# Patient Record
Sex: FEMALE | Race: WHITE | Employment: FULL TIME | URBAN - METROPOLITAN AREA
[De-identification: names, ages, dates, MRNs, and addresses within clinical notes are randomized per-mention and may not be internally consistent; named-entity substitution may affect disease eponyms.]

---

## 2022-03-18 PROBLEM — K62.89 IDIOPATHIC PROCTITIS: Status: ACTIVE | Noted: 2017-03-07

## 2022-03-19 PROBLEM — E66.9 EXTREME OBESITY: Status: ACTIVE | Noted: 2017-03-07

## 2022-03-19 PROBLEM — E66.8 EXTREME OBESITY: Status: ACTIVE | Noted: 2017-03-07

## 2022-03-19 PROBLEM — F41.9 ANXIETY AND DEPRESSION: Status: ACTIVE | Noted: 2017-03-07

## 2022-03-19 PROBLEM — F32.A ANXIETY AND DEPRESSION: Status: ACTIVE | Noted: 2017-03-07

## 2022-03-19 PROBLEM — E28.39 HYPOESTROGENISM: Status: ACTIVE | Noted: 2017-03-07

## 2022-03-20 PROBLEM — R00.2 INTERMITTENT PALPITATIONS: Status: ACTIVE | Noted: 2017-03-07

## 2024-05-02 ENCOUNTER — OFFICE VISIT (OUTPATIENT)
Dept: SURGERY | Age: 44
End: 2024-05-02
Payer: COMMERCIAL

## 2024-05-02 VITALS — WEIGHT: 146.6 LBS | HEART RATE: 5 BPM | SYSTOLIC BLOOD PRESSURE: 133 MMHG | DIASTOLIC BLOOD PRESSURE: 82 MMHG

## 2024-05-02 DIAGNOSIS — R10.9 ABDOMINAL WALL PAIN: Primary | ICD-10-CM

## 2024-05-02 DIAGNOSIS — R18.8 ABDOMINAL WALL FLUID COLLECTIONS: ICD-10-CM

## 2024-05-02 PROCEDURE — 99203 OFFICE O/P NEW LOW 30 MIN: CPT | Performed by: SURGERY

## 2024-05-02 RX ORDER — LORAZEPAM 0.5 MG/1
0.5 TABLET ORAL EVERY 8 HOURS PRN
COMMUNITY
Start: 2023-09-15

## 2024-05-02 RX ORDER — LOSARTAN POTASSIUM 25 MG/1
25 TABLET ORAL EVERY MORNING
COMMUNITY

## 2024-05-02 RX ORDER — LISDEXAMFETAMINE DIMESYLATE 50 MG/1
50 CAPSULE ORAL EVERY MORNING
COMMUNITY
Start: 2023-08-09

## 2024-05-02 RX ORDER — DEXTROAMPHETAMINE SACCHARATE, AMPHETAMINE ASPARTATE, DEXTROAMPHETAMINE SULFATE AND AMPHETAMINE SULFATE 2.5; 2.5; 2.5; 2.5 MG/1; MG/1; MG/1; MG/1
10 TABLET ORAL DAILY PRN
COMMUNITY
Start: 2023-08-25

## 2024-05-02 RX ORDER — VITAMIN B COMPLEX
1000 TABLET ORAL
COMMUNITY

## 2024-05-02 RX ORDER — BUPROPION HYDROCHLORIDE 150 MG/1
150 TABLET, EXTENDED RELEASE ORAL DAILY
COMMUNITY

## 2024-05-02 RX ORDER — PANTOPRAZOLE SODIUM 40 MG/1
40 TABLET, DELAYED RELEASE ORAL DAILY
COMMUNITY
Start: 2024-01-05

## 2024-05-02 ASSESSMENT — ENCOUNTER SYMPTOMS
ALLERGIC/IMMUNOLOGIC NEGATIVE: 1
EYES NEGATIVE: 1
RESPIRATORY NEGATIVE: 1
GASTROINTESTINAL NEGATIVE: 1

## 2024-05-02 NOTE — PROGRESS NOTES
5/2/2024    Veronica Gentile  MRN: [id number]      CHIEF COMPLAINT: Painful masses on left chest and abdominal wall      PRIMARY CARE PHYSICIAN: Bhavin Delcid MD      HISTORY:  Several years of areas of tenderness and pain on the left chest and abdominal wall.  Seems to get worse at times.  Cannot feel a discrete mass.  She has a history of endometriosis and is status post total hysterectomy.  She has had history of laparoscopic appendectomy.  She has had imaging done at outside hospitals, reports are available but I am not able to view the images.  Ultrasound has some suggestion of a fluid collection.  She denies fevers or chills.  She has had no changes in her bowel or bladder habits.  CT  IMPRESSION:   Full evaluation of solid viscera is limited secondary to lack of intravenous contrast.   1. No acute abnormality is seen within the abdomen or pelvis by CT criteria.   FINDINGS:   Sonographic images of the intra-abdominal wall were obtained.   US  There is a 0.8 x 0.4 x 0.3 cm hypoechoic focus and 3.5 x 2.0 x 0.4 cm hypoechoic focus below the LEFT rib cage. These appear to represent small fluid collections. Both of these have smooth margins with no vascularity.     IMPRESSION   IMPRESSION:   2 small hypoechoic foci below the level of the LEFT rib cage appear to represent fluid collections       REVIEW OF SYSTEMS:  Review of Systems   Constitutional: Negative.    HENT: Negative.     Eyes: Negative.    Respiratory: Negative.     Cardiovascular: Negative.    Gastrointestinal: Negative.    Endocrine: Negative.    Genitourinary: Negative.    Musculoskeletal: Negative.    Skin: Negative.    Allergic/Immunologic: Negative.    Neurological: Negative.    Hematological: Negative.    Psychiatric/Behavioral: Negative.            History reviewed. No pertinent past medical history.    Current Outpatient Medications   Medication Sig Dispense Refill    Vitamin D (CHOLECALCIFEROL) 25 MCG (1000 UT) TABS tablet

## 2024-05-23 ENCOUNTER — OFFICE VISIT (OUTPATIENT)
Dept: SURGERY | Age: 44
End: 2024-05-23
Payer: COMMERCIAL

## 2024-05-23 ENCOUNTER — PREP FOR PROCEDURE (OUTPATIENT)
Dept: SURGERY | Age: 44
End: 2024-05-23

## 2024-05-23 VITALS — SYSTOLIC BLOOD PRESSURE: 154 MMHG | WEIGHT: 147.5 LBS | HEART RATE: 81 BPM | DIASTOLIC BLOOD PRESSURE: 98 MMHG

## 2024-05-23 DIAGNOSIS — R18.8 ABDOMINAL WALL FLUID COLLECTIONS: ICD-10-CM

## 2024-05-23 DIAGNOSIS — R22.2 ABDOMINAL WALL MASS: ICD-10-CM

## 2024-05-23 DIAGNOSIS — R10.9 ABDOMINAL WALL PAIN: Primary | ICD-10-CM

## 2024-05-23 PROCEDURE — 99214 OFFICE O/P EST MOD 30 MIN: CPT | Performed by: SURGERY

## 2024-05-23 ASSESSMENT — ENCOUNTER SYMPTOMS
ALLERGIC/IMMUNOLOGIC NEGATIVE: 1
RESPIRATORY NEGATIVE: 1
GASTROINTESTINAL NEGATIVE: 1
EYES NEGATIVE: 1

## 2024-05-23 NOTE — PROGRESS NOTES
5/23/2024    Veronica Gentile  MRN: [id number]      CHIEF COMPLAINT: Review imaging      PRIMARY CARE PHYSICIAN: Gina Madison, APRN - NP      HISTORY:  Several years of areas of tenderness and pain on the left chest and abdominal wall.  Seems to get worse at times.  Cannot feel a discrete mass.  She has a history of endometriosis and is status post total hysterectomy.  She has had history of laparoscopic appendectomy.  She has had imaging done at outside hospitals, reports are available but I am not able to view the images.  Ultrasound has some suggestion of a fluid collection.  She denies fevers or chills.  She has had no changes in her bowel or bladder habits.  CT  IMPRESSION:   Full evaluation of solid viscera is limited secondary to lack of intravenous contrast.   1. No acute abnormality is seen within the abdomen or pelvis by CT criteria.   FINDINGS:   Sonographic images of the intra-abdominal wall were obtained.   US  There is a 0.8 x 0.4 x 0.3 cm hypoechoic focus and 3.5 x 2.0 x 0.4 cm hypoechoic focus below the LEFT rib cage. These appear to represent small fluid collections. Both of these have smooth margins with no vascularity.    She is here for follow-up after recent CT and ultrasound which both demonstrated no abnormalities.  She tells me she can still feel a lump in the left subcostal location.    REVIEW OF SYSTEMS:  Review of Systems   Constitutional: Negative.    HENT: Negative.     Eyes: Negative.    Respiratory: Negative.     Cardiovascular: Negative.    Gastrointestinal: Negative.    Endocrine: Negative.    Genitourinary: Negative.    Musculoskeletal: Negative.    Skin: Negative.    Allergic/Immunologic: Negative.    Neurological: Negative.    Hematological: Negative.    Psychiatric/Behavioral: Negative.            History reviewed. No pertinent past medical history.    Current Outpatient Medications   Medication Sig Dispense Refill    Vitamin D (CHOLECALCIFEROL) 25 MCG (1000 UT)

## 2024-06-14 ENCOUNTER — TELEPHONE (OUTPATIENT)
Dept: SURGERY | Age: 44
End: 2024-06-14

## 2024-06-19 NOTE — PERIOP NOTE
Patient verified name and .  Order for consent not found in EHR.   Type 1B surgery, PAT phone assessment complete.   Labs per surgeon:  Orders not received.  Labs per anesthesia protocol: none.    Patient answered medical/surgical history questions at their best of ability. All prior to admission medications documented in EPIC.    Patient instructed to continue taking all prescription medications up to the day of surgery but to take only the following medications the day of surgery according to anesthesia guidelines with a small sip of water: ativan if needed, metoprolol ,protonix. Also, patient is requested to take 2 Tylenol in the morning and then again before bed on the day before surgery. Regular or extra strength may be used.       Patient informed that all vitamins and supplements should be held 7 days prior to surgery and NSAIDS 5 days prior to surgery.     Patient instructed on the following:    > Arrive at A Entrance, time of arrival to be called the day before by 1700  > NPO after midnight, unless otherwise indicated, including gum, mints, and ice chips  > Responsible adult must drive patient to the hospital, stay during surgery, and patient will need supervision 24 hours after anesthesia  > Use non moisturizing soap in shower the night before surgery and on the morning of surgery  > All piercings must be removed prior to arrival.    > Leave all valuables (money and jewelry) at home but bring insurance card and ID on DOS.   > Do not wear make-up, nail polish, lotions, cologne, perfumes, powders, or oil on skin. Artificial nails are not permitted.

## 2024-06-23 ENCOUNTER — ANESTHESIA EVENT (OUTPATIENT)
Dept: SURGERY | Age: 44
End: 2024-06-23
Payer: COMMERCIAL

## 2024-06-24 ENCOUNTER — ANESTHESIA (OUTPATIENT)
Dept: SURGERY | Age: 44
End: 2024-06-24
Payer: COMMERCIAL

## 2024-06-24 ENCOUNTER — HOSPITAL ENCOUNTER (OUTPATIENT)
Age: 44
Setting detail: OUTPATIENT SURGERY
Discharge: HOME | End: 2024-06-24
Attending: SURGERY | Admitting: SURGERY
Payer: COMMERCIAL

## 2024-06-24 VITALS
OXYGEN SATURATION: 100 % | RESPIRATION RATE: 16 BRPM | WEIGHT: 146 LBS | SYSTOLIC BLOOD PRESSURE: 131 MMHG | DIASTOLIC BLOOD PRESSURE: 92 MMHG | BODY MASS INDEX: 25.87 KG/M2 | HEART RATE: 61 BPM | TEMPERATURE: 97.9 F | HEIGHT: 63 IN

## 2024-06-24 PROCEDURE — 2709999900 HC NON-CHARGEABLE SUPPLY: Performed by: SURGERY

## 2024-06-24 PROCEDURE — 6360000002 HC RX W HCPCS: Performed by: ANESTHESIOLOGY

## 2024-06-24 PROCEDURE — 3700000001 HC ADD 15 MINUTES (ANESTHESIA): Performed by: SURGERY

## 2024-06-24 PROCEDURE — 22902 EXC ABD LES SC < 3 CM: CPT | Performed by: SURGERY

## 2024-06-24 PROCEDURE — 2580000003 HC RX 258: Performed by: ANESTHESIOLOGY

## 2024-06-24 PROCEDURE — 88305 TISSUE EXAM BY PATHOLOGIST: CPT

## 2024-06-24 PROCEDURE — 7100000000 HC PACU RECOVERY - FIRST 15 MIN: Performed by: SURGERY

## 2024-06-24 PROCEDURE — 3700000000 HC ANESTHESIA ATTENDED CARE: Performed by: SURGERY

## 2024-06-24 PROCEDURE — 6360000002 HC RX W HCPCS: Performed by: SURGERY

## 2024-06-24 PROCEDURE — 2500000003 HC RX 250 WO HCPCS: Performed by: NURSE ANESTHETIST, CERTIFIED REGISTERED

## 2024-06-24 PROCEDURE — 6360000002 HC RX W HCPCS: Performed by: NURSE ANESTHETIST, CERTIFIED REGISTERED

## 2024-06-24 PROCEDURE — 6370000000 HC RX 637 (ALT 250 FOR IP): Performed by: ANESTHESIOLOGY

## 2024-06-24 PROCEDURE — 7100000011 HC PHASE II RECOVERY - ADDTL 15 MIN: Performed by: SURGERY

## 2024-06-24 PROCEDURE — 7100000001 HC PACU RECOVERY - ADDTL 15 MIN: Performed by: SURGERY

## 2024-06-24 PROCEDURE — 3600000012 HC SURGERY LEVEL 2 ADDTL 15MIN: Performed by: SURGERY

## 2024-06-24 PROCEDURE — 3600000002 HC SURGERY LEVEL 2 BASE: Performed by: SURGERY

## 2024-06-24 PROCEDURE — 88304 TISSUE EXAM BY PATHOLOGIST: CPT

## 2024-06-24 PROCEDURE — 7100000010 HC PHASE II RECOVERY - FIRST 15 MIN: Performed by: SURGERY

## 2024-06-24 RX ORDER — FENTANYL CITRATE 50 UG/ML
100 INJECTION, SOLUTION INTRAMUSCULAR; INTRAVENOUS
Status: DISCONTINUED | OUTPATIENT
Start: 2024-06-24 | End: 2024-06-24 | Stop reason: HOSPADM

## 2024-06-24 RX ORDER — SODIUM CHLORIDE 0.9 % (FLUSH) 0.9 %
5-40 SYRINGE (ML) INJECTION PRN
Status: DISCONTINUED | OUTPATIENT
Start: 2024-06-24 | End: 2024-06-24 | Stop reason: HOSPADM

## 2024-06-24 RX ORDER — OXYCODONE HYDROCHLORIDE 5 MG/1
5 TABLET ORAL
Status: DISCONTINUED | OUTPATIENT
Start: 2024-06-24 | End: 2024-06-24 | Stop reason: HOSPADM

## 2024-06-24 RX ORDER — MIDAZOLAM HYDROCHLORIDE 1 MG/ML
INJECTION INTRAMUSCULAR; INTRAVENOUS PRN
Status: DISCONTINUED | OUTPATIENT
Start: 2024-06-24 | End: 2024-06-24 | Stop reason: SDUPTHER

## 2024-06-24 RX ORDER — LIDOCAINE HYDROCHLORIDE 20 MG/ML
INJECTION, SOLUTION EPIDURAL; INFILTRATION; INTRACAUDAL; PERINEURAL PRN
Status: DISCONTINUED | OUTPATIENT
Start: 2024-06-24 | End: 2024-06-24 | Stop reason: SDUPTHER

## 2024-06-24 RX ORDER — SODIUM CHLORIDE 9 MG/ML
INJECTION, SOLUTION INTRAVENOUS PRN
Status: DISCONTINUED | OUTPATIENT
Start: 2024-06-24 | End: 2024-06-24 | Stop reason: HOSPADM

## 2024-06-24 RX ORDER — FENTANYL CITRATE 50 UG/ML
INJECTION, SOLUTION INTRAMUSCULAR; INTRAVENOUS PRN
Status: DISCONTINUED | OUTPATIENT
Start: 2024-06-24 | End: 2024-06-24 | Stop reason: SDUPTHER

## 2024-06-24 RX ORDER — SCOLOPAMINE TRANSDERMAL SYSTEM 1 MG/1
1 PATCH, EXTENDED RELEASE TRANSDERMAL ONCE
Status: DISCONTINUED | OUTPATIENT
Start: 2024-06-24 | End: 2024-06-24 | Stop reason: HOSPADM

## 2024-06-24 RX ORDER — SODIUM CHLORIDE, SODIUM LACTATE, POTASSIUM CHLORIDE, CALCIUM CHLORIDE 600; 310; 30; 20 MG/100ML; MG/100ML; MG/100ML; MG/100ML
INJECTION, SOLUTION INTRAVENOUS CONTINUOUS
Status: DISCONTINUED | OUTPATIENT
Start: 2024-06-24 | End: 2024-06-24 | Stop reason: HOSPADM

## 2024-06-24 RX ORDER — GINSENG 100 MG
CAPSULE ORAL PRN
Status: DISCONTINUED | OUTPATIENT
Start: 2024-06-24 | End: 2024-06-24 | Stop reason: ALTCHOICE

## 2024-06-24 RX ORDER — ACETAMINOPHEN 500 MG
1000 TABLET ORAL ONCE
Status: COMPLETED | OUTPATIENT
Start: 2024-06-24 | End: 2024-06-24

## 2024-06-24 RX ORDER — SODIUM CHLORIDE 0.9 % (FLUSH) 0.9 %
5-40 SYRINGE (ML) INJECTION EVERY 12 HOURS SCHEDULED
Status: DISCONTINUED | OUTPATIENT
Start: 2024-06-24 | End: 2024-06-24 | Stop reason: HOSPADM

## 2024-06-24 RX ORDER — MIDAZOLAM HYDROCHLORIDE 2 MG/2ML
2 INJECTION, SOLUTION INTRAMUSCULAR; INTRAVENOUS
Status: DISCONTINUED | OUTPATIENT
Start: 2024-06-24 | End: 2024-06-24 | Stop reason: HOSPADM

## 2024-06-24 RX ORDER — NALOXONE HYDROCHLORIDE 0.4 MG/ML
INJECTION, SOLUTION INTRAMUSCULAR; INTRAVENOUS; SUBCUTANEOUS PRN
Status: DISCONTINUED | OUTPATIENT
Start: 2024-06-24 | End: 2024-06-24 | Stop reason: HOSPADM

## 2024-06-24 RX ORDER — PROPOFOL 10 MG/ML
INJECTION, EMULSION INTRAVENOUS PRN
Status: DISCONTINUED | OUTPATIENT
Start: 2024-06-24 | End: 2024-06-24 | Stop reason: SDUPTHER

## 2024-06-24 RX ORDER — EPHEDRINE SULFATE/0.9% NACL/PF 50 MG/5 ML
SYRINGE (ML) INTRAVENOUS PRN
Status: DISCONTINUED | OUTPATIENT
Start: 2024-06-24 | End: 2024-06-24 | Stop reason: SDUPTHER

## 2024-06-24 RX ORDER — BUPIVACAINE HYDROCHLORIDE 5 MG/ML
INJECTION, SOLUTION EPIDURAL; INTRACAUDAL PRN
Status: DISCONTINUED | OUTPATIENT
Start: 2024-06-24 | End: 2024-06-24 | Stop reason: ALTCHOICE

## 2024-06-24 RX ORDER — DIPHENHYDRAMINE HYDROCHLORIDE 50 MG/ML
12.5 INJECTION INTRAMUSCULAR; INTRAVENOUS
Status: DISCONTINUED | OUTPATIENT
Start: 2024-06-24 | End: 2024-06-24 | Stop reason: HOSPADM

## 2024-06-24 RX ORDER — ONDANSETRON 2 MG/ML
4 INJECTION INTRAMUSCULAR; INTRAVENOUS
Status: DISCONTINUED | OUTPATIENT
Start: 2024-06-24 | End: 2024-06-24 | Stop reason: HOSPADM

## 2024-06-24 RX ORDER — ONDANSETRON 2 MG/ML
INJECTION INTRAMUSCULAR; INTRAVENOUS PRN
Status: DISCONTINUED | OUTPATIENT
Start: 2024-06-24 | End: 2024-06-24 | Stop reason: SDUPTHER

## 2024-06-24 RX ADMIN — MIDAZOLAM 2 MG: 1 INJECTION INTRAMUSCULAR; INTRAVENOUS at 07:00

## 2024-06-24 RX ADMIN — FENTANYL CITRATE 100 MCG: 50 INJECTION, SOLUTION INTRAMUSCULAR; INTRAVENOUS at 07:07

## 2024-06-24 RX ADMIN — SODIUM CHLORIDE, POTASSIUM CHLORIDE, SODIUM LACTATE AND CALCIUM CHLORIDE: 600; 310; 30; 20 INJECTION, SOLUTION INTRAVENOUS at 06:30

## 2024-06-24 RX ADMIN — ONDANSETRON 4 MG: 2 INJECTION INTRAMUSCULAR; INTRAVENOUS at 07:20

## 2024-06-24 RX ADMIN — LIDOCAINE HYDROCHLORIDE 60 MG: 20 INJECTION, SOLUTION EPIDURAL; INFILTRATION; INTRACAUDAL; PERINEURAL at 07:07

## 2024-06-24 RX ADMIN — ACETAMINOPHEN 1000 MG: 500 TABLET, FILM COATED ORAL at 06:20

## 2024-06-24 RX ADMIN — Medication 2000 MG: at 07:02

## 2024-06-24 RX ADMIN — HYDROMORPHONE HYDROCHLORIDE 0.5 MG: 1 INJECTION, SOLUTION INTRAMUSCULAR; INTRAVENOUS; SUBCUTANEOUS at 07:56

## 2024-06-24 RX ADMIN — Medication 10 MG: at 07:35

## 2024-06-24 RX ADMIN — PROPOFOL 200 MG: 10 INJECTION, EMULSION INTRAVENOUS at 07:07

## 2024-06-24 ASSESSMENT — PAIN DESCRIPTION - LOCATION
LOCATION: ABDOMEN

## 2024-06-24 ASSESSMENT — PAIN DESCRIPTION - ORIENTATION
ORIENTATION: MID

## 2024-06-24 ASSESSMENT — PAIN SCALES - GENERAL
PAINLEVEL_OUTOF10: 3
PAINLEVEL_OUTOF10: 5
PAINLEVEL_OUTOF10: 3
PAINLEVEL_OUTOF10: 7
PAINLEVEL_OUTOF10: 3
PAINLEVEL_OUTOF10: 3

## 2024-06-24 ASSESSMENT — PAIN DESCRIPTION - DESCRIPTORS
DESCRIPTORS: ACHING;BURNING
DESCRIPTORS: ACHING
DESCRIPTORS: ACHING;BURNING
DESCRIPTORS: ACHING;BURNING
DESCRIPTORS: ACHING;CRAMPING
DESCRIPTORS: ACHING;BURNING
DESCRIPTORS: ACHING;BURNING

## 2024-06-24 ASSESSMENT — PAIN - FUNCTIONAL ASSESSMENT: PAIN_FUNCTIONAL_ASSESSMENT: 0-10

## 2024-06-24 NOTE — ANESTHESIA POSTPROCEDURE EVALUATION
Department of Anesthesiology  Postprocedure Note    Patient: Veronica Gentile  MRN: [id number]  YOB: 1980  Date of evaluation: 6/24/2024    Procedure Summary       Date: 06/24/24 [place] [facility] [place] [facility] [place]    Anesthesia Start: 0702 Anesthesia Stop: 0751    Procedure: ABDOMEN MASS EXCISION Diagnosis:       Abdominal wall mass      (Abdominal wall mass [R22.2])    Surgeons: Yordy Ellis Jr., MD Responsible Provider: Kyle Webb MD    Anesthesia Type: general ASA Status: 2            Anesthesia Type: No value filed.    Israel Phase I: Israel Score: 8    Israel Phase II:      Anesthesia Post Evaluation    Patient location during evaluation: PACU  Patient participation: complete - patient participated  Level of consciousness: awake and alert  Airway patency: patent  Nausea & Vomiting: no nausea and no vomiting  Cardiovascular status: hemodynamically stable  Respiratory status: acceptable, nonlabored ventilation and spontaneous ventilation  Hydration status: euvolemic  Comments: /64   Pulse 53   Temp 98.4 °F (36.9 °C) (Infrared)   Resp 14   Ht 1.6 m (5' 3\")   Wt 66.2 kg (146 lb)   SpO2 98%   BMI 25.86 kg/m²     Multimodal analgesia pain management approach  Pain management: adequate and satisfactory to patient    No notable events documented.

## 2024-06-24 NOTE — H&P
Magnesium Oxide, Laxative, 500 MG TABS Take by mouth        Multiple Vitamin (MULTI VITAMIN PO) Take by mouth        amphetamine-dextroamphetamine (ADDERALL) 10 MG tablet Take 1 tablet by mouth daily as needed.        ascorbic acid (VITAMIN C) 1000 MG tablet Take 1 tablet by mouth daily        lisdexamfetamine (VYVANSE) 50 MG capsule Take 1 capsule by mouth every morning.        LORazepam (ATIVAN) 0.5 MG tablet Take 1 tablet by mouth every 8 hours as needed.        losartan (COZAAR) 25 MG tablet Take 1 tablet by mouth every morning        metoprolol tartrate (LOPRESSOR) 25 MG tablet Take 1 tablet by mouth 2 times daily        pantoprazole (PROTONIX) 40 MG tablet Take 1 tablet by mouth daily        BIOTIN PO Take by mouth          No current facility-administered medications for this visit.            Family History   History reviewed. No pertinent family history.        Social History   Social History            Socioeconomic History    Marital status: Single       Spouse name: None    Number of children: None    Years of education: None    Highest education level: None   Tobacco Use    Smoking status: Former       Types: Cigarettes    Smokeless tobacco: Never               PHYSICAL EXAMINATION:  Physical Exam  Constitutional:       Appearance: Normal appearance.   Cardiovascular:      Rate and Rhythm: Normal rate and regular rhythm.   Pulmonary:      Effort: Pulmonary effort is normal.   Abdominal:      Palpations: Abdomen is soft.   Musculoskeletal:         General: Normal range of motion.      Cervical back: Normal range of motion and neck supple.   Skin:     General: Skin is warm and dry.      Comments: Small palpable nodule measuring approximately 3mm just below the left rib cage on the abdominal wall.  She is very tender at the site.   Neurological:      General: No focal deficit present.      Mental Status: She is alert and oriented to person, place, and time.      Sensory: Sensation is intact.

## 2024-06-24 NOTE — BRIEF OP NOTE
Brief Postoperative Note      Patient: Veronica Gentile  YOB: 1980  MRN: [id number]    Date of Procedure: 6/24/2024    Pre-Op Diagnosis Codes:     * Abdominal wall mass [R22.2]    Post-Op Diagnosis: Same       Procedure(s):  ABDOMEN MASS EXCISION    Surgeon(s):  Yordy Ellis Jr., MD    Assistant:  First Assistant: January Raman    Anesthesia: General    Estimated Blood Loss (mL): 5 mL    Complications: None    Specimens:   ID Type Source Tests Collected by Time Destination   A : Abdominal Wall Mass Tissue Abdomen SURGICAL PATHOLOGY Yordy Ellis Jr., MD 6/24/2024 0724        Implants:  * No implants in log *      Drains: * No LDAs found *    Findings:  Infection Present At Time Of Surgery (PATOS) (choose all levels that have infection present):  No infection present  Other Findings: 8 mm fatty appearing mass      Electronically signed by Yordy Ellis Jr, MD on 6/24/2024 at 8:55 AM

## 2024-06-24 NOTE — ANESTHESIA PRE PROCEDURE
Cigarettes     Start date: 10/2019     Quit date: 10/1996     Years since quittin.7   • Smokeless tobacco: Never   Substance Use Topics   • Alcohol use: Yes     Comment: occas                                Counseling given: Not Answered      Vital Signs (Current):   Vitals:    24 1157 24 0556   BP:  (!) 162/96   Pulse:  67   Resp:  16   Temp:  98.1 °F (36.7 °C)   TempSrc:  Temporal   SpO2:  100%   Weight: 65.8 kg (145 lb) 66.2 kg (146 lb)   Height: 1.6 m (5' 3\") 1.6 m (5' 3\")                                              BP Readings from Last 3 Encounters:   24 (!) 162/96   24 (!) 154/98   24 133/82       NPO Status: Time of last liquid consumption:                         Time of last solid consumption:                         Date of last liquid consumption: 24                        Date of last solid food consumption: 24    BMI:   Wt Readings from Last 3 Encounters:   24 66.2 kg (146 lb)   24 66.9 kg (147 lb 8 oz)   24 66.5 kg (146 lb 9.6 oz)     Body mass index is 25.86 kg/m².    CBC: No results found for: \"WBC\", \"RBC\", \"HGB\", \"HCT\", \"MCV\", \"RDW\", \"PLT\"    CMP: No results found for: \"NA\", \"K\", \"CL\", \"CO2\", \"BUN\", \"CREATININE\", \"GFRAA\", \"AGRATIO\", \"LABGLOM\", \"GLUCOSE\", \"GLU\", \"PROT\", \"CALCIUM\", \"BILITOT\", \"ALKPHOS\", \"AST\", \"ALT\"    POC Tests: No results for input(s): \"POCGLU\", \"POCNA\", \"POCK\", \"POCCL\", \"POCBUN\", \"POCHEMO\", \"POCHCT\" in the last 72 hours.    Coags: No results found for: \"PROTIME\", \"INR\", \"APTT\"    HCG (If Applicable): No results found for: \"PREGTESTUR\", \"PREGSERUM\", \"HCG\", \"HCGQUANT\"     ABGs: No results found for: \"PHART\", \"PO2ART\", \"OPX8WKT\", \"JMV6ASO\", \"BEART\", \"U2NVSXWR\"     Type & Screen (If Applicable):  No results found for: \"LABABO\"    Drug/Infectious Status (If Applicable):  No results found for: \"HIV\", \"HEPCAB\"    COVID-19 Screening (If Applicable): No results found for: \"COVID19\"        Anesthesia

## 2024-06-24 NOTE — DISCHARGE INSTRUCTIONS
Activity: Please take it easy for a couple of days after surgery.  Walking is encouraged. The sooner you are up walking the faster your recovery. You should avoid lifting, pushing, and/or pulling anything greater than 10 pounds for 2 weeks following surgery (a gallon of milk weighs approximately 8.6 lbs). You may resume work and full activity within 2-4 weeks. This will help your incision stay intact.     Diet: You may resume a regular diet. The prescription for pain medication sometimes may cause nausea. If this happens, eat bland foods such as toast or crackers and sip ginger ale. If nausea is severe, please call our office.    You may have on a Scopolamine patch placed behind your ear for nausea prevention. The patch is effective for 72 hours after placement. This medication may cause dizziness or blurred vision. The patch may be removed prior to 72 hours if nausea is not present. You may peel it off, being sure to wash hands thoroughly after removal.    Pain: You may experience some pain in the surgical area. Use Ibuprofen (Advil) and/or Acetaminophen (Tylenol) if needed.     Care of your incisions: You will have dissolvable stitches and/or Dermabond, which is a type of skin glue. The sutures may stick up from the skin but they will fall off. You may shower 24 hours following surgery, but do not submerge your incisions in a bath, hot tub, or pool for 2 weeks. You may notice the incision feels like a hard ridge, this is to be expected. You may use a cold (ice) compress on the area of the incision for the first 24 hours to reduce swelling. Do not leave the compress in place for longer than 20-30 minutes at a time to protect the skin. Please no heat or heating pad. Using heat can easily cause a hematoma.     Bowel Elimination: Constipation may occur after surgery due to both the anesthesia and the narcotic pain medication.  Please try prune juice, Milk of Magnesia, a mild laxative such as Dulcolax, or a saline

## 2024-06-24 NOTE — OP NOTE
with the original specimen.  Hemostasis was achieved at the site.  Additional local anesthetic was infiltrated into the deeper tissues.  The deep tissues were closed with 3-0 Vicryl sutures after ensuring hemostasis.  The dermis was closed with 3-0 Vicryl and the skin with a 4-0 Monocryl subcuticular stitch followed by Dermabond.  She tolerated the procedure well without complications.  Lap and instrument count at the completion of the procedure was correct x 2.  Estimated blood loss was 5 mL or less.  Patient condition at the completion was stable and she was awoken from anesthesia then transported to recovery in stable condition.    Electronically signed by Yrody Ellis Jr, MD on 6/24/2024 at 8:55 AM

## 2024-07-02 ENCOUNTER — OFFICE VISIT (OUTPATIENT)
Dept: SURGERY | Age: 44
End: 2024-07-02

## 2024-07-02 DIAGNOSIS — Z09 POSTOP CHECK: Primary | ICD-10-CM

## 2024-07-02 PROCEDURE — 99024 POSTOP FOLLOW-UP VISIT: CPT | Performed by: SURGERY

## 2024-07-02 ASSESSMENT — ENCOUNTER SYMPTOMS
GASTROINTESTINAL NEGATIVE: 1
ALLERGIC/IMMUNOLOGIC NEGATIVE: 1
EYES NEGATIVE: 1
RESPIRATORY NEGATIVE: 1

## 2024-10-28 NOTE — PROGRESS NOTES
[facility] History & Physical                 Reason for Visit: Palpitations    Subjective:     Patient is a 44 y.o. female with a PMH of ill-defined condition (SVT), hyperlipidemia and hypertension who presents as a referral for palpitations.  The patient reports that she feels a \"terrible fluttering feeling\" in the chest on a daily basis.  She says that her chest will \"feel really heavy\" with SOB associated with the symptoms.  She reports that she has been taking losartan 100 mg daily for the last month and is on Lopressor 25 mg twice daily.  The patient reports a history of bradycardia with higher doses of Lopressor.  She reports that she was on a thiazide diuretic in the past but renal dysfunction ensued.  She also reports a history of cough with lisinopril.    Past Medical History:   Diagnosis Date    ADHD     Anxiety and depression     As needed meds    Gastroesophageal reflux disease with stricture     managed with meds    Hypertension     managed with meds    Palpitations     Tachycardia     managed with metoprolol      Past Surgical History:   Procedure Laterality Date    ABDOMEN SURGERY N/A 2024    ABDOMEN MASS EXCISION performed by Yordy Ellis Jr., [facility] at [facility]    APPENDECTOMY       SECTION      HYSTERECTOMY (CERVIX STATUS UNKNOWN)        No family history on file.   Social History     Tobacco Use    Smoking status: Former     Types: Cigarettes     Start date: 10/2019     Quit date: 10/1996     Years since quittin.0    Smokeless tobacco: Never   Substance Use Topics    Alcohol use: Yes     Comment: occas      Allergies   Allergen Reactions    Iodinated Contrast Media Palpitations and Shortness Of Breath     Patient reports \"heart felt like it was going to explode and couldn't breath\"         ROS:  No obvious pertinent positives on review of systems except for what was outlined above.       Objective:       BP (!) 160/110   Pulse 71   Ht 1.6 m (5' 3\")   Wt 72.6 kg

## 2024-10-30 ENCOUNTER — OFFICE VISIT (OUTPATIENT)
Age: 44
End: 2024-10-30
Payer: COMMERCIAL

## 2024-10-30 VITALS
DIASTOLIC BLOOD PRESSURE: 110 MMHG | HEIGHT: 63 IN | SYSTOLIC BLOOD PRESSURE: 160 MMHG | HEART RATE: 71 BPM | BODY MASS INDEX: 28.35 KG/M2 | WEIGHT: 160 LBS

## 2024-10-30 DIAGNOSIS — R00.2 PALPITATIONS: ICD-10-CM

## 2024-10-30 DIAGNOSIS — R06.00 DYSPNEA, UNSPECIFIED TYPE: ICD-10-CM

## 2024-10-30 DIAGNOSIS — R69 ILL-DEFINED CONDITION: ICD-10-CM

## 2024-10-30 DIAGNOSIS — I10 HYPERTENSION, UNSPECIFIED TYPE: Primary | ICD-10-CM

## 2024-10-30 DIAGNOSIS — R07.89 ATYPICAL CHEST PAIN: ICD-10-CM

## 2024-10-30 PROCEDURE — 93000 ELECTROCARDIOGRAM COMPLETE: CPT | Performed by: INTERNAL MEDICINE

## 2024-10-30 PROCEDURE — 3077F SYST BP >= 140 MM HG: CPT | Performed by: INTERNAL MEDICINE

## 2024-10-30 PROCEDURE — 99204 OFFICE O/P NEW MOD 45 MIN: CPT | Performed by: INTERNAL MEDICINE

## 2024-10-30 PROCEDURE — 3080F DIAST BP >= 90 MM HG: CPT | Performed by: INTERNAL MEDICINE

## 2024-10-30 RX ORDER — AMLODIPINE BESYLATE 5 MG/1
5 TABLET ORAL DAILY
Qty: 90 TABLET | Refills: 3 | Status: SHIPPED | OUTPATIENT
Start: 2024-10-30

## 2024-10-30 RX ORDER — LOSARTAN POTASSIUM 100 MG/1
100 TABLET ORAL DAILY
COMMUNITY

## 2024-11-12 ENCOUNTER — TELEPHONE (OUTPATIENT)
Age: 44
End: 2024-11-12

## 2024-11-12 NOTE — TELEPHONE ENCOUNTER
----- Message from Dr. Lion Fontenot MD sent at 11/12/2024 10:32 AM EST -----  Please let the patient know that she was predominantly in sinus rhythm with an average heart rate of 79 bpm.  She had nonsustained VT versus SVT with aberrancy x 1 as well as nonsustained SVT.  Rare ectopy was noted.  No patient events were noted.  No new changes to medical therapy at this time.  
Advised patient of monitor results and Dr. Fontenot's response. Patient verbalized understanding.   
Connie with [employer] called to report that Zio monitor showed 1 run of V-tach for 12 beats, maximum HR-174, average HR-151. Monitor also showed 3 runs of SVT. Monitor has been downloaded into medical record. Per medical record, patient is scheduled for echo on 11/27/24 and appointment with Dr. Fontenot on 12/2/24.   
numerical 0-10

## 2024-11-29 NOTE — PROGRESS NOTES
[facility] CARDIOLOGY Follow Up                 Reason for Visit: Follow-up testing    Subjective:     Patient is a 44 y.o. female with a PMH of paroxysmal SVT, hyperlipidemia and hypertension who presents for follow-up.  The patient was last seen in 2024.  Amlodipine was started for hypertension.  A sleep medicine referral was placed.  A ZIO for 3 days was ordered for palpitations.  A TTE was ordered for dyspnea.  She was noted to be predominantly in sinus rhythm with an average heart rate of 79 bpm.  Nonsustained VT versus SVT with aberrancy x 1 was noted.  Nonsustained SVT and rare ectopy was noted.  No patient events were noted.  She had a TTE in 2024 that was noted to demonstrate a normal EF.    Past Medical History:   Diagnosis Date    ADHD     Anxiety and depression     As needed meds    Gastroesophageal reflux disease with stricture     managed with meds    Hypertension     managed with meds    Palpitations     Tachycardia     managed with metoprolol      Past Surgical History:   Procedure Laterality Date    ABDOMEN SURGERY N/A 2024    ABDOMEN MASS EXCISION performed by Yordy Ellis Jr., MD at [facility] MAIN OR    APPENDECTOMY       SECTION      HYSTERECTOMY (CERVIX STATUS UNKNOWN)        No family history on file.   Social History     Tobacco Use    Smoking status: Former     Types: Cigarettes     Start date: 10/2019     Quit date: 10/1996     Years since quittin.1    Smokeless tobacco: Never   Substance Use Topics    Alcohol use: Yes     Comment: occas      Allergies   Allergen Reactions    Iodinated Contrast Media Palpitations and Shortness Of Breath     Patient reports \"heart felt like it was going to explode and couldn't breath\"         ROS:  No obvious pertinent positives on review of systems except for what was outlined above.       Objective:       BP (!) 120/90   Pulse 64   Ht 1.524 m (5')   Wt 73.5 kg (162 lb)   BMI 31.64 kg/m²     BP Readings from Last 3

## 2024-12-02 ENCOUNTER — OFFICE VISIT (OUTPATIENT)
Age: 44
End: 2024-12-02
Payer: COMMERCIAL

## 2024-12-02 ENCOUNTER — TELEPHONE (OUTPATIENT)
Age: 44
End: 2024-12-02

## 2024-12-02 VITALS
HEIGHT: 60 IN | WEIGHT: 162 LBS | HEART RATE: 64 BPM | BODY MASS INDEX: 31.8 KG/M2 | SYSTOLIC BLOOD PRESSURE: 120 MMHG | DIASTOLIC BLOOD PRESSURE: 90 MMHG

## 2024-12-02 DIAGNOSIS — E66.9 OBESITY (BMI 30-39.9): ICD-10-CM

## 2024-12-02 DIAGNOSIS — I47.10 PAROXYSMAL SVT (SUPRAVENTRICULAR TACHYCARDIA) (HCC): Primary | ICD-10-CM

## 2024-12-02 DIAGNOSIS — I10 HYPERTENSION, UNSPECIFIED TYPE: ICD-10-CM

## 2024-12-02 PROCEDURE — 99214 OFFICE O/P EST MOD 30 MIN: CPT | Performed by: INTERNAL MEDICINE

## 2024-12-02 PROCEDURE — 3074F SYST BP LT 130 MM HG: CPT | Performed by: INTERNAL MEDICINE

## 2024-12-02 PROCEDURE — 3080F DIAST BP >= 90 MM HG: CPT | Performed by: INTERNAL MEDICINE

## 2024-12-02 NOTE — TELEPHONE ENCOUNTER
Triage informed pt of Dr. Fontenot's response to her echo results. She verbalizes understanding and agrees to plan.

## 2024-12-02 NOTE — TELEPHONE ENCOUNTER
----- Message from Dr. Lion Fontenot MD sent at 11/29/2024 10:30 AM EST -----  Please let the patient know that the heart function is normal on ECHO.

## 2025-04-04 NOTE — PROGRESS NOTES
reflux, pathologic hypersomnolence, memory loss, and glucose intolerance was related to the consequences of hypoxemia, hypercapnia, airway obstruction, and sympathetic overdrive.  We also discussed the ability of nasal CPAP to correct these abnormalities through maintenance of a patent airway.  Therapeutic options including surgery, oral appliances, and weight loss were also reviewed.     2. Morning headache     Occasional     3. Hypertension, unspecified type     On 3 different bp medications           PLAN:  Home sleep test     Follow up with the [facility] will be after the study  or sooner if needed       Orders Placed This Encounter   Procedures    Ambulatory Referral to Sleep Studies     Referral Priority:   Routine     Referral Type:   Consult for Advice and Opinion     Referral Reason:   Specialty Services Required     Number of Visits Requested:   1     No orders of the defined types were placed in this encounter.        Collaborating Physician: Dr. Brannon PHILIP spent at least 25 minutes with this established patient, and >50% of the time was spent counseling and/or coordinating care regarding nhan.    NAN Fritz - CNP  Electronically signed

## 2025-04-07 ENCOUNTER — TELEMEDICINE (OUTPATIENT)
Dept: SLEEP MEDICINE | Age: 45
End: 2025-04-07

## 2025-04-07 DIAGNOSIS — I10 HYPERTENSION, UNSPECIFIED TYPE: ICD-10-CM

## 2025-04-07 DIAGNOSIS — G47.00 PERSISTENT DISORDER OF INITIATING OR MAINTAINING SLEEP: Primary | ICD-10-CM

## 2025-04-07 DIAGNOSIS — R51.9 MORNING HEADACHE: ICD-10-CM

## 2025-04-07 PROCEDURE — 99203 OFFICE O/P NEW LOW 30 MIN: CPT | Performed by: NURSE PRACTITIONER

## 2025-04-07 NOTE — PATIENT INSTRUCTIONS
You will get a call from [facility] to schedule your at-home sleep study.     The day of your sleep study, you will go to [facility] ([address], 3rd floor, [address]). There you will be given the device and taught how to use the device.   You will go home, sleep that night with the home sleep test, and bring the device back the next day.    A physician will read the study and you will receive a call from our office with results or to schedule a follow-up appointment with the Sleep Center to discuss treatment options.

## 2025-05-01 ENCOUNTER — TELEPHONE (OUTPATIENT)
Age: 45
End: 2025-05-01

## 2025-05-01 ENCOUNTER — PATIENT MESSAGE (OUTPATIENT)
Age: 45
End: 2025-05-01

## 2025-05-01 DIAGNOSIS — E66.9 OBESITY (BMI 30-39.9): ICD-10-CM

## 2025-05-01 DIAGNOSIS — R00.2 PALPITATIONS: ICD-10-CM

## 2025-05-01 DIAGNOSIS — I10 HYPERTENSION: ICD-10-CM

## 2025-05-01 DIAGNOSIS — R22.2 ABDOMINAL WALL MASS: ICD-10-CM

## 2025-05-01 DIAGNOSIS — R06.00 DYSPNEA: ICD-10-CM

## 2025-05-01 DIAGNOSIS — E28.39 HYPOESTROGENISM: ICD-10-CM

## 2025-05-01 DIAGNOSIS — F32.A ANXIETY AND DEPRESSION: ICD-10-CM

## 2025-05-01 DIAGNOSIS — I47.10 PAROXYSMAL SVT (SUPRAVENTRICULAR TACHYCARDIA): ICD-10-CM

## 2025-05-01 DIAGNOSIS — Z79.899 LONG-TERM USE OF HIGH-RISK MEDICATION: Primary | ICD-10-CM

## 2025-05-01 DIAGNOSIS — R07.89 ATYPICAL CHEST PAIN: ICD-10-CM

## 2025-05-01 DIAGNOSIS — K62.89 IDIOPATHIC PROCTITIS: ICD-10-CM

## 2025-05-01 DIAGNOSIS — F41.9 ANXIETY AND DEPRESSION: ICD-10-CM

## 2025-05-01 DIAGNOSIS — R69 ILL-DEFINED CONDITION: ICD-10-CM

## 2025-05-01 RX ORDER — LOSARTAN POTASSIUM 100 MG/1
100 TABLET ORAL DAILY
Qty: 90 TABLET | Refills: 3 | Status: SHIPPED | OUTPATIENT
Start: 2025-05-01

## 2025-05-01 NOTE — TELEPHONE ENCOUNTER
Advised patient of Dr. Fontenot's response. Advised patient to go to any [facility] outpatient lab for BMP and magnesium. Advised patient that losartan refill has been sent to [facility] in [place]. Advised patient that someone will be calling to schedule new PCP appointment. Patient verbalized understanding. Routed this triage note to scheduling pool.

## 2025-05-01 NOTE — TELEPHONE ENCOUNTER
Medication  (Newest Message First)               5/1/25  8:18 AM  Ilda Oshea MA routed this conversation to [facility] Cardiology Triage (Selected Message)  Veronica Gentile to P [facility] Cardiology Clinical Staff (supporting Lion Fontenot MD)        5/1/25  8:16 AM  Good morning,      My family doctor that prescribed my Losartan is no longer with the practice and is practicing elsewhere that isn't local. I requested a refill earlier this week from them and they denied my refill this morning because I haven't established care with anyone else there. I took my last Losartan this morning. Is there anyway that Dr. Fontenot could send in a refill for me?      Thank you!

## 2025-05-01 NOTE — TELEPHONE ENCOUNTER
Per medical record, losartan was continued at last appointment with Dr. Fontenot on 12/2/24. Next scheduled appointment with Dr. Fontenot is 6/6/25.

## 2025-05-01 NOTE — TELEPHONE ENCOUNTER
Orders Placed This Encounter   Procedures    Basic Metabolic Panel     Standing Status:   Future     Expected Date:   5/1/2025     Expiration Date:   5/1/2026    Magnesium     Standing Status:   Future     Expected Date:   5/1/2025     Expiration Date:   5/1/2026    Amb Referral to Primary Care     Referral Priority:   Routine     Referral Type:   Eval and Treat     Referral Reason:   Specialty Services Required     Number of Visits Requested:   1     Requested Prescriptions     Signed Prescriptions Disp Refills    losartan (COZAAR) 100 MG tablet 90 tablet 3     Sig: Take 1 tablet by mouth daily 1 tablet by mouth daily     Authorizing Provider: ZULLY LOPEZ     Ordering User: ASHWINI ORELLANA     Losartan, as above, E-prescribed to [facility] in [place].

## 2025-05-01 NOTE — TELEPHONE ENCOUNTER
Lion Fontenot MD Keener, Lynn F RN  Caller: Unspecified (Today,  8:38 AM)  That is fine.  However, obtain a BMP and a magnesium level to recheck her electrolytes and kidney function on the medication.  Furthermore, please place a referral to internal medicine at [place].

## 2025-05-02 DIAGNOSIS — Z79.899 LONG-TERM USE OF HIGH-RISK MEDICATION: ICD-10-CM

## 2025-05-02 LAB
ANION GAP SERPL CALC-SCNC: 10 MMOL/L (ref 7–16)
BUN SERPL-MCNC: 16 MG/DL (ref 6–23)
CALCIUM SERPL-MCNC: 9.5 MG/DL (ref 8.8–10.2)
CHLORIDE SERPL-SCNC: 106 MMOL/L (ref 98–107)
CO2 SERPL-SCNC: 28 MMOL/L (ref 20–29)
CREAT SERPL-MCNC: 0.74 MG/DL (ref 0.6–1.1)
GLUCOSE SERPL-MCNC: 86 MG/DL (ref 70–99)
MAGNESIUM SERPL-MCNC: 2.2 MG/DL (ref 1.8–2.4)
POTASSIUM SERPL-SCNC: 3.7 MMOL/L (ref 3.5–5.1)
SODIUM SERPL-SCNC: 143 MMOL/L (ref 136–145)

## 2025-05-04 ENCOUNTER — RESULTS FOLLOW-UP (OUTPATIENT)
Age: 45
End: 2025-05-04

## 2025-05-05 NOTE — TELEPHONE ENCOUNTER
----- Message from Dr. Lion Fontenot MD sent at 5/4/2025  2:41 PM EDT -----  Please let the patient know that the chemistry profile was normal.

## 2025-05-08 ENCOUNTER — TELEPHONE (OUTPATIENT)
Age: 45
End: 2025-05-08

## 2025-05-08 ENCOUNTER — PATIENT MESSAGE (OUTPATIENT)
Age: 45
End: 2025-05-08

## 2025-05-08 NOTE — TELEPHONE ENCOUNTER
Sleep Study  (Newest Message First)               5/8/25 12:36 PM  Ilda Oshea MA routed this conversation to [facility] Triage (Selected Message)  Veronica Gentile to P [facility] Clinical Staff (supporting Lion Fontenot MD)  FARA      5/8/25 12:05 PM  Good afternoon,      After receiving my estimated cost for the sleep study that was scheduled for this weekend, I had to cancel the appointment. I can not afford to have the test done. My cost was over $600.      I just wanted to update you and let you know why I wasn't having the study done.      Thank you!

## 2025-06-05 NOTE — PROGRESS NOTES
[facility] CARDIOLOGY Follow Up                 Reason for Visit: History of paroxysmal SVT    Subjective:     Patient is a 45 y.o. female with a PMH of paroxysmal SVT, hyperlipidemia and hypertension who presents for follow-up.  The patient was last seen in 2024.  The patient did not get a sleep study, and reports that it is cost prohibitive.  She had a TTE in 2024 that was noted to demonstrate a normal EF. The patient denies angina and dyspnea.  She reports palpitations daily though \"they are not always bad\".  The palpitations are not lifestyle limiting.      Past Medical History:   Diagnosis Date    ADHD     Anxiety and depression     As needed meds    Gastroesophageal reflux disease with stricture     managed with meds    Hypertension     managed with meds    Palpitations     Tachycardia     managed with metoprolol      Past Surgical History:   Procedure Laterality Date    ABDOMEN SURGERY N/A 2024    ABDOMEN MASS EXCISION performed by Yordy Ellis Jr., [place] at [facility]    APPENDECTOMY       SECTION      HYSTERECTOMY (CERVIX STATUS UNKNOWN)        No family history on file.   Social History     Tobacco Use    Smoking status: Former     Current packs/day: 0.50     Average packs/day: 0.5 packs/day for 5.7 years (2.8 ttl pk-yrs)     Types: Cigarettes     Start date: 10/2019     Quit date: 10/1996    Smokeless tobacco: Never   Substance Use Topics    Alcohol use: Yes     Comment: occas      Allergies   Allergen Reactions    Iodinated Contrast Media Palpitations and Shortness Of Breath     Patient reports \"heart felt like it was going to explode and couldn't breath\"         ROS:  No obvious pertinent positives on review of systems except for what was outlined above.       Objective:       /80   Pulse 80   Ht 1.6 m (5' 3\")   Wt 72.7 kg (160 lb 3.2 oz)   BMI 28.38 kg/m²     BP Readings from Last 3 Encounters:   25 112/80   24 (!) 120/90   24 126/82       Wt

## 2025-06-06 ENCOUNTER — OFFICE VISIT (OUTPATIENT)
Age: 45
End: 2025-06-06
Payer: COMMERCIAL

## 2025-06-06 VITALS
WEIGHT: 160.2 LBS | SYSTOLIC BLOOD PRESSURE: 112 MMHG | DIASTOLIC BLOOD PRESSURE: 80 MMHG | HEART RATE: 80 BPM | BODY MASS INDEX: 28.39 KG/M2 | HEIGHT: 63 IN

## 2025-06-06 DIAGNOSIS — Z86.79 HISTORY OF PAROXYSMAL SUPRAVENTRICULAR TACHYCARDIA: Primary | ICD-10-CM

## 2025-06-06 DIAGNOSIS — R00.2 PALPITATIONS: ICD-10-CM

## 2025-06-06 DIAGNOSIS — I10 HYPERTENSION, UNSPECIFIED TYPE: ICD-10-CM

## 2025-06-06 PROCEDURE — 3074F SYST BP LT 130 MM HG: CPT | Performed by: INTERNAL MEDICINE

## 2025-06-06 PROCEDURE — 99214 OFFICE O/P EST MOD 30 MIN: CPT | Performed by: INTERNAL MEDICINE

## 2025-06-06 PROCEDURE — 3079F DIAST BP 80-89 MM HG: CPT | Performed by: INTERNAL MEDICINE

## 2025-06-06 RX ORDER — LISDEXAMFETAMINE DIMESYLATE 50 MG/1
50 CAPSULE ORAL EVERY MORNING
COMMUNITY

## (undated) DEVICE — APPLICATOR MEDICATED 26 CC SOLUTION HI LT ORNG CHLORAPREP

## (undated) DEVICE — SUTURE MONOCRYL SZ 4-0 L27IN ABSRB UD L19MM PS-2 1/2 CIR PRIM Y426H

## (undated) DEVICE — NEEDLE HYPO 21GA L1.5IN INTRAMUSCULAR S STL LATCH BVL UP

## (undated) DEVICE — ELECTRODE ES L25IN PTFE MEGAFINE EXT NDL E Z CLN

## (undated) DEVICE — GARMENT,MEDLINE,DVT,INT,CALF,LG, GEN2: Brand: MEDLINE

## (undated) DEVICE — SURGICAL PROCEDURE PACK BASIC ST FRANCIS

## (undated) DEVICE — ELECTRODE PT RET AD L9FT HI MOIST COND ADH HYDRGEL CORDED

## (undated) DEVICE — GLOVE SURG SZ 75 L12IN FNGR THK79MIL GRN LTX FREE

## (undated) DEVICE — SUTURE VICRYL + SZ 3-0 L27IN ABSRB UD L26MM SH 1/2 CIR VCP416H

## (undated) DEVICE — CANISTER, RIGID, 2000CC: Brand: MEDLINE INDUSTRIES, INC.

## (undated) DEVICE — SHEET, T, LAPAROTOMY, STERILE: Brand: MEDLINE